# Patient Record
Sex: MALE | Race: WHITE | NOT HISPANIC OR LATINO | Employment: FULL TIME | URBAN - METROPOLITAN AREA
[De-identification: names, ages, dates, MRNs, and addresses within clinical notes are randomized per-mention and may not be internally consistent; named-entity substitution may affect disease eponyms.]

---

## 2024-04-11 ENCOUNTER — OFFICE VISIT (OUTPATIENT)
Dept: DERMATOLOGY | Facility: CLINIC | Age: 64
End: 2024-04-11
Payer: COMMERCIAL

## 2024-04-11 DIAGNOSIS — L72.0 EPIDERMAL CYST: ICD-10-CM

## 2024-04-11 DIAGNOSIS — L56.8 ACTINIC CHEILITIS: Primary | ICD-10-CM

## 2024-04-11 DIAGNOSIS — L57.0 ACTINIC KERATOSIS: ICD-10-CM

## 2024-04-11 DIAGNOSIS — L82.0 SEBORRHEIC KERATOSIS, INFLAMED: ICD-10-CM

## 2024-04-11 PROCEDURE — 11300 SHAVE SKIN LESION 0.5 CM/<: CPT | Performed by: SPECIALIST

## 2024-04-11 PROCEDURE — 99204 OFFICE O/P NEW MOD 45 MIN: CPT | Performed by: SPECIALIST

## 2024-04-11 PROCEDURE — 88305 TISSUE EXAM BY PATHOLOGIST: CPT | Performed by: DERMATOLOGY

## 2024-04-11 RX ORDER — FLUOROURACIL 20 MG/ML
SOLUTION TOPICAL
Qty: 10 ML | Refills: 0 | Status: SHIPPED | OUTPATIENT
Start: 2024-04-11

## 2024-04-11 NOTE — PROGRESS NOTES
Subjective     Tres Freedman is a 63 y.o. male who presents for the following: Body Exam.     Review of Systems:  No other skin or systemic complaints other than what is documented elsewhere in the note.    The following portions of the chart were reviewed this encounter and updated as appropriate:   Allergies           Specialty Problems    None       Objective   Well appearing patient in no apparent distress; mood and affect are within normal limits.    A focused skin examination was performed. All findings within normal limits unless otherwise noted below.    Assessment/Plan   1. Actinic cheilitis    2. Seborrheic keratosis, inflamed  Mid Back    Skin biopsy - Mid Back  Type of biopsy: incisional    Timeout: patient name, date of birth, surgical site, and procedure verified      Specimen 1 - Dermatopathology- DERM LAB  Differential Diagnosis: ISK   Check Margins Yes/No?:    Comments:    Dermpath Lab: Routine Histopathology (formalin-fixed tissue)    3. Actinic keratosis    4. Epidermal cyst (2)  Right Upper Arm - Anterior; Chest - Medial (Center)        Assessment/Plan: Patient has significant photodamage of his lower lip.  There is significant scaling and crusting.  This is a widespread actinic cheilitis.  I discussed actinic cheilitis in detail.  I also discussed treatment with 5-FU topically patient was agreeable.  Patient also has an irritated crusted lesion mid back.  Diagnostic considerations would include irritated seborrheic keratosis or squamous cell carcinoma.    Patient is here for a full body exam. Full body exam done in the presence of chaperone.     Eyes: Conjunctiva normal lids normal. Mouth and throat: Lips normal teeth normal gums normal.  Oropharynx is moist and normal.   Neck: Neck is supple without masses.   CV: Extremities are  normal without calf tenderness edema varicosities.   Abdomen: Is no hepatosplenomegaly.  Lymphatic: Is no cervical axillary or inguinal lymphadenopathy.    Extremities: Inspection palpation  of digits and nails is normal without clubbing or cyanosis.   Neuro/psych: Orientation to time,  place, person situation is normal.   Mood/affect is normal.   Skin: Palpation of scalp is normal  inspection of Hair and scalp, eyebrows, face, and extremities normal. Inspection/palpation of  Head/face mild photo damage. Neck mild photo damage. Chest mild photo damage. Breasts are  normal axillary vaults are normal. Abdomen normal.   Genitalia normal groin normal, buttocks normal. Back mild photo damage. Right upper extremity photo damage. Left upper extremity photo damage. Right lower extremity photo damage. Left lower extremity normal. Inspection of eccrine apocrine glands of skin and subcutaneous tissues normal.

## 2024-04-15 LAB
LABORATORY COMMENT REPORT: NORMAL
PATH REPORT.FINAL DX SPEC: NORMAL
PATH REPORT.GROSS SPEC: NORMAL
PATH REPORT.MICROSCOPIC SPEC OTHER STN: NORMAL
PATH REPORT.RELEVANT HX SPEC: NORMAL
PATH REPORT.TOTAL CANCER: NORMAL

## 2024-05-09 ENCOUNTER — APPOINTMENT (OUTPATIENT)
Dept: DERMATOLOGY | Facility: CLINIC | Age: 64
End: 2024-05-09
Payer: COMMERCIAL

## 2024-05-13 ENCOUNTER — APPOINTMENT (OUTPATIENT)
Dept: DERMATOLOGY | Facility: CLINIC | Age: 64
End: 2024-05-13
Payer: COMMERCIAL

## 2024-05-31 ENCOUNTER — APPOINTMENT (OUTPATIENT)
Dept: DERMATOLOGY | Facility: CLINIC | Age: 64
End: 2024-05-31
Payer: COMMERCIAL

## 2024-06-14 ENCOUNTER — APPOINTMENT (OUTPATIENT)
Dept: DERMATOLOGY | Facility: CLINIC | Age: 64
End: 2024-06-14
Payer: COMMERCIAL

## 2024-06-21 ENCOUNTER — APPOINTMENT (OUTPATIENT)
Dept: DERMATOLOGY | Facility: CLINIC | Age: 64
End: 2024-06-21
Payer: COMMERCIAL